# Patient Record
Sex: MALE | Race: WHITE | NOT HISPANIC OR LATINO | URBAN - METROPOLITAN AREA
[De-identification: names, ages, dates, MRNs, and addresses within clinical notes are randomized per-mention and may not be internally consistent; named-entity substitution may affect disease eponyms.]

---

## 2017-05-17 ENCOUNTER — PRE VISIT (OUTPATIENT)
Dept: UROLOGY | Facility: CLINIC | Age: 62
End: 2017-05-17

## 2018-04-27 ASSESSMENT — MIFFLIN-ST. JEOR: SCORE: 1683

## 2018-04-30 ENCOUNTER — ANESTHESIA - HEALTHEAST (OUTPATIENT)
Dept: SURGERY | Facility: HOSPITAL | Age: 63
End: 2018-04-30

## 2018-05-01 ENCOUNTER — SURGERY - HEALTHEAST (OUTPATIENT)
Dept: SURGERY | Facility: HOSPITAL | Age: 63
End: 2018-05-01

## 2018-05-01 ASSESSMENT — MIFFLIN-ST. JEOR: SCORE: 1679.62

## 2021-06-01 VITALS — BODY MASS INDEX: 23.61 KG/M2 | WEIGHT: 184 LBS | HEIGHT: 74 IN

## 2021-06-17 NOTE — ANESTHESIA PREPROCEDURE EVALUATION
Anesthesia Evaluation      Patient summary reviewed   No history of anesthetic complications     Airway   Mallampati: I  Neck ROM: full   Pulmonary - negative ROS and normal exam                          Cardiovascular - normal exam  Exercise tolerance: > or = 4 METS  (+) , hypercholesterolemia,      Neuro/Psych - negative ROS     Endo/Other - negative ROS      GI/Hepatic/Renal - negative ROS      Other findings: Prostate Ca. Labs ordered but are not back yet.      Dental - normal exam                        Anesthesia Plan  Planned anesthetic: general endotracheal    ASA 1   Induction: intravenous   Anesthetic plan and risks discussed with: patient    Post-op plan: routine recovery

## 2021-06-17 NOTE — ANESTHESIA CARE TRANSFER NOTE
Last vitals:   Vitals:    05/01/18 1532   BP: 176/87   Pulse: 92   Resp:    Temp: 36.6  C (97.8  F)   SpO2: 100%     Patient's level of consciousness is drowsy  Spontaneous respirations: yes  Maintains airway independently: yes  Dentition unchanged: yes  Oropharynx: oropharynx clear of all foreign objects    QCDR Measures:  ASA# 20 - Surgical Safety Checklist: WHO surgical safety checklist completed prior to induction  PQRS# 430 - Adult PONV Prevention: 4558F - Pt received => 2 anti-emetic agents (different classes) preop & intraop  ASA# 8 - Peds PONV Prevention: NA - Not pediatric patient, not GA or 2 or more risk factors NOT present  PQRS# 424 - Petra-op Temp Management: 4559F - At least one body temp DOCUMENTED => 35.5C or 95.9F within required timeframe  PQRS# 426 - PACU Transfer Protocol: - Transfer of care checklist used  ASA# 14 - Acute Post-op Pain: ASA14B - Patient did NOT experience pain >= 7 out of 10

## 2021-06-17 NOTE — ANESTHESIA POSTPROCEDURE EVALUATION
Patient: David A Myhre  ROBOTIC ASSISTED RADICAL RETROPUBIC PROSTATECTOMY BILATERAL PELVIC LYMPH NODE DISSECTION, LYSIS OF ADHESIONS  Anesthesia type: general    Patient location: PACU  Last vitals:   Vitals:    05/01/18 1540   BP: (!) 172/96   Pulse: 93   Resp: 27   Temp:    SpO2: 98%     Post vital signs: see below  Level of consciousness: alert, answers questions readily  Post-anesthesia pain: pt states he is not painful, but just got dilaudid  Post-anesthesia nausea and vomiting: no  Pulmonary: room air  Cardiovascular: BP up a little, will follow trend but likely will come down with narcotic just given  Hydration: adequate  Anesthetic events: no    QCDR Measures:  ASA# 11 - Petra-op Cardiac Arrest: ASA11B - Patient did NOT experience unanticipated cardiac arrest  ASA# 12 - Petra-op Mortality Rate: ASA12B - Patient did NOT die  ASA# 13 - PACU Re-Intubation Rate: ASA13B - Patient did NOT require a new airway mgmt  ASA# 10 - Composite Anes Safety: ASA10A - No serious adverse event    Additional Notes:

## 2025-03-25 ENCOUNTER — LAB REQUISITION (OUTPATIENT)
Dept: LAB | Facility: CLINIC | Age: 70
End: 2025-03-25
Payer: COMMERCIAL

## 2025-03-25 DIAGNOSIS — M25.529 PAIN IN UNSPECIFIED ELBOW: ICD-10-CM

## 2025-03-25 DIAGNOSIS — Z00.00 ENCOUNTER FOR GENERAL ADULT MEDICAL EXAMINATION WITHOUT ABNORMAL FINDINGS: ICD-10-CM

## 2025-03-25 LAB
BACTERIA SPEC CULT: NORMAL
GRAM STAIN RESULT: NORMAL
GRAM STAIN RESULT: NORMAL

## 2025-03-25 PROCEDURE — 87070 CULTURE OTHR SPECIMN AEROBIC: CPT | Mod: ORL | Performed by: STUDENT IN AN ORGANIZED HEALTH CARE EDUCATION/TRAINING PROGRAM

## 2025-03-25 PROCEDURE — 87075 CULTR BACTERIA EXCEPT BLOOD: CPT | Mod: ORL | Performed by: STUDENT IN AN ORGANIZED HEALTH CARE EDUCATION/TRAINING PROGRAM

## 2025-03-25 PROCEDURE — 87205 SMEAR GRAM STAIN: CPT | Mod: ORL | Performed by: STUDENT IN AN ORGANIZED HEALTH CARE EDUCATION/TRAINING PROGRAM

## 2025-03-25 PROCEDURE — 87077 CULTURE AEROBIC IDENTIFY: CPT | Mod: ORL | Performed by: STUDENT IN AN ORGANIZED HEALTH CARE EDUCATION/TRAINING PROGRAM

## 2025-03-27 LAB
BACTERIA SNV CULT: ABNORMAL
BACTERIA SNV CULT: ABNORMAL
BACTERIA SNV CULT: NORMAL

## 2025-03-29 LAB
BACTERIA SNV CULT: ABNORMAL
BACTERIA SNV CULT: ABNORMAL

## 2025-03-30 LAB — BACTERIA SNV CULT: NO GROWTH

## 2025-04-08 LAB
BACTERIA SNV CULT: ABNORMAL
BACTERIA SNV CULT: ABNORMAL